# Patient Record
Sex: FEMALE | Race: WHITE | NOT HISPANIC OR LATINO
[De-identification: names, ages, dates, MRNs, and addresses within clinical notes are randomized per-mention and may not be internally consistent; named-entity substitution may affect disease eponyms.]

---

## 2019-02-08 ENCOUNTER — RECORD ABSTRACTING (OUTPATIENT)
Age: 79
End: 2019-02-08

## 2019-02-08 DIAGNOSIS — Z78.9 OTHER SPECIFIED HEALTH STATUS: ICD-10-CM

## 2019-02-08 DIAGNOSIS — Z87.891 PERSONAL HISTORY OF NICOTINE DEPENDENCE: ICD-10-CM

## 2019-02-08 DIAGNOSIS — Z86.39 PERSONAL HISTORY OF OTHER ENDOCRINE, NUTRITIONAL AND METABOLIC DISEASE: ICD-10-CM

## 2019-02-25 ENCOUNTER — APPOINTMENT (OUTPATIENT)
Dept: ENDOCRINOLOGY | Facility: CLINIC | Age: 79
End: 2019-02-25
Payer: MEDICARE

## 2019-02-25 VITALS
OXYGEN SATURATION: 97 % | SYSTOLIC BLOOD PRESSURE: 122 MMHG | WEIGHT: 118 LBS | BODY MASS INDEX: 20.14 KG/M2 | HEART RATE: 90 BPM | DIASTOLIC BLOOD PRESSURE: 80 MMHG | HEIGHT: 64 IN

## 2019-02-25 DIAGNOSIS — E04.1 NONTOXIC SINGLE THYROID NODULE: ICD-10-CM

## 2019-02-25 PROCEDURE — 99213 OFFICE O/P EST LOW 20 MIN: CPT

## 2019-02-25 NOTE — PHYSICAL EXAM
[Normal Rate and Effort] : normal respiratory rhythm and effort [Clear to Auscultation] : lungs were clear to auscultation bilaterally [Normal S1, S2] : normal S1 and S2 [Regular Rhythm] : with a regular rhythm [de-identified] : left lobe palpable, no nodule

## 2019-02-25 NOTE — HISTORY OF PRESENT ILLNESS
[FreeTextEntry1] : s/p right hemithyroidectomy in 2012 for benign disease. No h/o nodules in the left lobe. Normal thyroid function since surgery.\par \par PMH:\par hyperlipidemia. Cardiac evaluation including stress echo reported normal

## 2019-02-25 NOTE — ASSESSMENT
[FreeTextEntry1] : s/p partial thyroidectomy, clinically euthyroid\par hyperlipidemia\par updated labs ordered

## 2021-09-17 ENCOUNTER — EMERGENCY (EMERGENCY)
Facility: HOSPITAL | Age: 81
LOS: 1 days | Discharge: HOME | End: 2021-09-17
Attending: EMERGENCY MEDICINE
Payer: MEDICARE

## 2021-09-17 VITALS
RESPIRATION RATE: 16 BRPM | DIASTOLIC BLOOD PRESSURE: 80 MMHG | OXYGEN SATURATION: 95 % | TEMPERATURE: 98 F | HEIGHT: 66 IN | HEART RATE: 119 BPM | SYSTOLIC BLOOD PRESSURE: 154 MMHG

## 2021-09-17 VITALS
OXYGEN SATURATION: 96 % | SYSTOLIC BLOOD PRESSURE: 154 MMHG | RESPIRATION RATE: 14 BRPM | HEART RATE: 80 BPM | TEMPERATURE: 98 F | DIASTOLIC BLOOD PRESSURE: 76 MMHG

## 2021-09-17 DIAGNOSIS — E89.0 POSTPROCEDURAL HYPOTHYROIDISM: Chronic | ICD-10-CM

## 2021-09-17 LAB
ALBUMIN SERPL ELPH-MCNC: 4.2 G/DL — SIGNIFICANT CHANGE UP (ref 3.3–5.2)
ALP SERPL-CCNC: 46 U/L — SIGNIFICANT CHANGE UP (ref 40–120)
ALT FLD-CCNC: 17 U/L — SIGNIFICANT CHANGE UP
ANION GAP SERPL CALC-SCNC: 13 MMOL/L — SIGNIFICANT CHANGE UP (ref 5–17)
AST SERPL-CCNC: 25 U/L — SIGNIFICANT CHANGE UP
BASOPHILS # BLD AUTO: 0.02 K/UL — SIGNIFICANT CHANGE UP (ref 0–0.2)
BASOPHILS NFR BLD AUTO: 0.3 % — SIGNIFICANT CHANGE UP (ref 0–2)
BILIRUB SERPL-MCNC: 0.4 MG/DL — SIGNIFICANT CHANGE UP (ref 0.4–2)
BUN SERPL-MCNC: 13.2 MG/DL — SIGNIFICANT CHANGE UP (ref 8–20)
CALCIUM SERPL-MCNC: 9.3 MG/DL — SIGNIFICANT CHANGE UP (ref 8.6–10.2)
CHLORIDE SERPL-SCNC: 102 MMOL/L — SIGNIFICANT CHANGE UP (ref 98–107)
CK SERPL-CCNC: 103 U/L — SIGNIFICANT CHANGE UP (ref 25–170)
CO2 SERPL-SCNC: 25 MMOL/L — SIGNIFICANT CHANGE UP (ref 22–29)
CREAT SERPL-MCNC: 0.7 MG/DL — SIGNIFICANT CHANGE UP (ref 0.5–1.3)
EOSINOPHIL # BLD AUTO: 0.09 K/UL — SIGNIFICANT CHANGE UP (ref 0–0.5)
EOSINOPHIL NFR BLD AUTO: 1.5 % — SIGNIFICANT CHANGE UP (ref 0–6)
GLUCOSE SERPL-MCNC: 113 MG/DL — HIGH (ref 70–99)
HCT VFR BLD CALC: 43.3 % — SIGNIFICANT CHANGE UP (ref 34.5–45)
HGB BLD-MCNC: 13.9 G/DL — SIGNIFICANT CHANGE UP (ref 11.5–15.5)
IMM GRANULOCYTES NFR BLD AUTO: 0.2 % — SIGNIFICANT CHANGE UP (ref 0–1.5)
LYMPHOCYTES # BLD AUTO: 1.21 K/UL — SIGNIFICANT CHANGE UP (ref 1–3.3)
LYMPHOCYTES # BLD AUTO: 20.6 % — SIGNIFICANT CHANGE UP (ref 13–44)
MCHC RBC-ENTMCNC: 29.9 PG — SIGNIFICANT CHANGE UP (ref 27–34)
MCHC RBC-ENTMCNC: 32.1 GM/DL — SIGNIFICANT CHANGE UP (ref 32–36)
MCV RBC AUTO: 93.1 FL — SIGNIFICANT CHANGE UP (ref 80–100)
MONOCYTES # BLD AUTO: 0.71 K/UL — SIGNIFICANT CHANGE UP (ref 0–0.9)
MONOCYTES NFR BLD AUTO: 12.1 % — SIGNIFICANT CHANGE UP (ref 2–14)
NEUTROPHILS # BLD AUTO: 3.83 K/UL — SIGNIFICANT CHANGE UP (ref 1.8–7.4)
NEUTROPHILS NFR BLD AUTO: 65.3 % — SIGNIFICANT CHANGE UP (ref 43–77)
PLATELET # BLD AUTO: 157 K/UL — SIGNIFICANT CHANGE UP (ref 150–400)
POTASSIUM SERPL-MCNC: 4.5 MMOL/L — SIGNIFICANT CHANGE UP (ref 3.5–5.3)
POTASSIUM SERPL-SCNC: 4.5 MMOL/L — SIGNIFICANT CHANGE UP (ref 3.5–5.3)
PROT SERPL-MCNC: 7 G/DL — SIGNIFICANT CHANGE UP (ref 6.6–8.7)
RAPID RVP RESULT: SIGNIFICANT CHANGE UP
RBC # BLD: 4.65 M/UL — SIGNIFICANT CHANGE UP (ref 3.8–5.2)
RBC # FLD: 12.6 % — SIGNIFICANT CHANGE UP (ref 10.3–14.5)
SARS-COV-2 RNA SPEC QL NAA+PROBE: SIGNIFICANT CHANGE UP
SODIUM SERPL-SCNC: 140 MMOL/L — SIGNIFICANT CHANGE UP (ref 135–145)
TROPONIN T SERPL-MCNC: <0.01 NG/ML — SIGNIFICANT CHANGE UP (ref 0–0.06)
WBC # BLD: 5.87 K/UL — SIGNIFICANT CHANGE UP (ref 3.8–10.5)
WBC # FLD AUTO: 5.87 K/UL — SIGNIFICANT CHANGE UP (ref 3.8–10.5)

## 2021-09-17 PROCEDURE — 70450 CT HEAD/BRAIN W/O DYE: CPT | Mod: MA

## 2021-09-17 PROCEDURE — 0225U NFCT DS DNA&RNA 21 SARSCOV2: CPT

## 2021-09-17 PROCEDURE — 80053 COMPREHEN METABOLIC PANEL: CPT

## 2021-09-17 PROCEDURE — 99285 EMERGENCY DEPT VISIT HI MDM: CPT | Mod: GC

## 2021-09-17 PROCEDURE — 93005 ELECTROCARDIOGRAM TRACING: CPT

## 2021-09-17 PROCEDURE — 84484 ASSAY OF TROPONIN QUANT: CPT

## 2021-09-17 PROCEDURE — 99284 EMERGENCY DEPT VISIT MOD MDM: CPT | Mod: 25

## 2021-09-17 PROCEDURE — 70450 CT HEAD/BRAIN W/O DYE: CPT | Mod: 26,MA

## 2021-09-17 PROCEDURE — 93010 ELECTROCARDIOGRAM REPORT: CPT

## 2021-09-17 PROCEDURE — 82550 ASSAY OF CK (CPK): CPT

## 2021-09-17 PROCEDURE — 85025 COMPLETE CBC W/AUTO DIFF WBC: CPT

## 2021-09-17 PROCEDURE — 36415 COLL VENOUS BLD VENIPUNCTURE: CPT

## 2021-09-17 RX ORDER — METOCLOPRAMIDE HCL 10 MG
10 TABLET ORAL ONCE
Refills: 0 | Status: COMPLETED | OUTPATIENT
Start: 2021-09-17 | End: 2021-09-17

## 2021-09-17 RX ORDER — SODIUM CHLORIDE 9 MG/ML
500 INJECTION INTRAMUSCULAR; INTRAVENOUS; SUBCUTANEOUS ONCE
Refills: 0 | Status: COMPLETED | OUTPATIENT
Start: 2021-09-17 | End: 2021-09-17

## 2021-09-17 RX ADMIN — SODIUM CHLORIDE 500 MILLILITER(S): 9 INJECTION INTRAMUSCULAR; INTRAVENOUS; SUBCUTANEOUS at 19:28

## 2021-09-17 NOTE — ED PROVIDER NOTE - CLINICAL SUMMARY MEDICAL DECISION MAKING FREE TEXT BOX
plan to do priority ct head and check labs, ekg nsr @ 108 no changes, ct head neg, will do near syncope work up and check covid , if all neg, will advise to f/u with cardiology and concussion clinic

## 2021-09-17 NOTE — ED PROVIDER NOTE - PROGRESS NOTE DETAILS
pt feels better, advised to rest and eat well, follow up with her cardiology and given concussion clinic f/u

## 2021-09-17 NOTE — ED PROVIDER NOTE - ATTENDING CONTRIBUTION TO CARE
Hermelindo AMARAL- 82 Y/O F with h/o hypothyroid, hld p/w dizziness today. Pt had episode of near syncope while shovelling  rocks at home  3 days ago and fell backwards on her head, no loc but hit it hard and didnt seek any care that day and today dizziness recurred and she got worried. No fever, chills. No wpt has diffuse pains on the neck and back and legs. No nausea, vomiting    Pt is alert, well appearing female, s1s2 normal reg, b/l clear breath sounds, abd soft, nt, nd, neuro exam aox3, cn 2-12 intact, no focal deficits, skin warm, dry, good turgor , normal gait, peerl eomi, neck soft,  no midline tenderness, full rom at neck    plan to do priority ct head and check labs, ekg nsr @ 108 no changes, ct head neg, will do near syncope work up and check covid , if all neg, will advise to f/u with cardiology and concussion clinic

## 2021-09-17 NOTE — ED PROVIDER NOTE - PHYSICAL EXAMINATION
Neurological: CN II-XII intact, no motor deficits, motor strength 5/5, no sensory loss, no focal neurologic deficits.

## 2021-09-17 NOTE — ED ADULT NURSE NOTE - OBJECTIVE STATEMENT
81y female AOx4 c/o dizziness/lightheadedness with movement. pt is s/p near syncopal fall on Tuesday 9/14/21. pt reports lightheadedness ever since fall. pt endorses headstrike, denies blood thinners. respirations even and unlabored. denies chest discomfort. speech WNL. no loss in strength or sensation in extremities. MAEx4 without difficulty. skin WNL for race.

## 2021-09-17 NOTE — ED PROVIDER NOTE - CARE PLAN
1 Principal Discharge DX:	Near syncope  Secondary Diagnosis:	Closed head injury without concussion

## 2021-09-17 NOTE — ED ADULT TRIAGE NOTE - CHIEF COMPLAINT QUOTE
Pt reports had a fall 2 days ago causing her to hit back of head on grass and denies LOC. Today pt had episode of "lightheadedness" that has since resolved but having palpitations. MD called for STAT eval.

## 2021-09-17 NOTE — ED PROVIDER NOTE - OBJECTIVE STATEMENT
Pt is a 81y Female with PMHx of hypercholesterolemia, cataracts, recurrent sinusitis, and vertigo presents to ED today c/o 2 episodes of lightheadedness x 2 days. Pt states 2 days ago she became lightheaded and felt "strange" and fell backwards and hit head on grass outside. Pt reports this episode felt different from prior dizziness associated with sinusitis and prior episodes of vertigo. Denies fever, chills, HA, LOC, chest pain, abdominal pain, N/V, changes in vision, changes in hearing, recent illness.

## 2021-09-17 NOTE — ED PROVIDER NOTE - NEURO NEGATIVE STATEMENT, MLM
+NEAR-SYNCOPE, +LIGHTHEADEDNESS, no loss of consciousness, no gait abnormality, no headache, no sensory deficits, and no weakness.

## 2021-09-17 NOTE — ED PROVIDER NOTE - PATIENT PORTAL LINK FT
You can access the FollowMyHealth Patient Portal offered by [facility] by registering at the following website: [email or web address]. By joining [facility]’s FollowMyHealth portal, you will also be able to view your health information using other applications (apps) compatible with our system.

## 2021-09-17 NOTE — ED PROVIDER NOTE - NSFOLLOWUPCLINICS_GEN_ALL_ED_FT
[facility] Concussion [facility]  Concussion  [facility] [place] [address] [place] [address]  Phone: [phone]  Fax:   Follow Up Time: 1-3 Days

## 2021-09-17 NOTE — ED PROVIDER NOTE - NSFOLLOWUPINSTRUCTIONS_ED_ALL_ED_FT
1. don't do heavy activity and exercise until all symptoms resolved  2. stay hydrated and rest  3. follow up with your cardiologist in this week  4. follow up with concussion clinic in 1 wk  5. return promptly in c/o worsening symptoms

## 2021-10-26 ENCOUNTER — APPOINTMENT (OUTPATIENT)
Dept: ENDOCRINOLOGY | Facility: CLINIC | Age: 81
End: 2021-10-26
Payer: MEDICARE

## 2021-10-26 VITALS — HEART RATE: 108 BPM | OXYGEN SATURATION: 98 %

## 2021-10-26 VITALS — BODY MASS INDEX: 20.83 KG/M2 | HEIGHT: 64 IN | WEIGHT: 122 LBS

## 2021-10-26 VITALS — SYSTOLIC BLOOD PRESSURE: 122 MMHG | DIASTOLIC BLOOD PRESSURE: 72 MMHG

## 2021-10-26 DIAGNOSIS — E78.00 PURE HYPERCHOLESTEROLEMIA, UNSPECIFIED: ICD-10-CM

## 2021-10-26 DIAGNOSIS — E89.0 POSTPROCEDURAL HYPOTHYROIDISM: ICD-10-CM

## 2021-10-26 PROCEDURE — 99213 OFFICE O/P EST LOW 20 MIN: CPT

## 2021-10-26 NOTE — ASSESSMENT
[FreeTextEntry1] : s/p partial thyroidectomy,  euthyroid\par hyperlipidemia on therapy\par \par (labs and hospital records reviewed)

## 2021-10-26 NOTE — PHYSICAL EXAM
[Thyroid Not Enlarged] : the thyroid was not enlarged [No Thyroid Nodules] : no palpable thyroid nodules [Well Healed Scar] : well healed scar [Clear to Auscultation] : lungs were clear to auscultation bilaterally [Normal Rate] : heart rate was normal

## 2021-10-26 NOTE — HISTORY OF PRESENT ILLNESS
[FreeTextEntry1] : s/p right hemithyroidectomy in 2012 for benign disease. No h/o nodules in the left lobe. Normal thyroid function since surgery.\par \par PMH:\par hyperlipidemia. Cardiac evaluation including stress echo reported normal\par \par near syncopal episode while working. ER evaluation negative

## 2021-11-24 ENCOUNTER — APPOINTMENT (OUTPATIENT)
Dept: ENDOCRINOLOGY | Facility: CLINIC | Age: 81
End: 2021-11-24

## 2023-02-17 ENCOUNTER — OFFICE (OUTPATIENT)
Dept: URBAN - METROPOLITAN AREA CLINIC 104 | Facility: CLINIC | Age: 83
Setting detail: OPHTHALMOLOGY
End: 2023-02-17
Payer: MEDICARE

## 2023-02-17 DIAGNOSIS — H43.813: ICD-10-CM

## 2023-02-17 DIAGNOSIS — H01.005: ICD-10-CM

## 2023-02-17 DIAGNOSIS — H01.002: ICD-10-CM

## 2023-02-17 DIAGNOSIS — H26.493: ICD-10-CM

## 2023-02-17 DIAGNOSIS — H04.123: ICD-10-CM

## 2023-02-17 DIAGNOSIS — H01.004: ICD-10-CM

## 2023-02-17 DIAGNOSIS — H01.001: ICD-10-CM

## 2023-02-17 DIAGNOSIS — H43.393: ICD-10-CM

## 2023-02-17 DIAGNOSIS — Z96.1: ICD-10-CM

## 2023-02-17 PROCEDURE — 92014 COMPRE OPH EXAM EST PT 1/>: CPT | Performed by: OPTOMETRIST

## 2023-02-17 ASSESSMENT — AXIALLENGTH_DERIVED
OD_AL: 23.13
OS_AL: 22.9279

## 2023-02-17 ASSESSMENT — VISUAL ACUITY
OS_BCVA: 20/25-2
OD_BCVA: 20/20-1

## 2023-02-17 ASSESSMENT — LID EXAM ASSESSMENTS
OD_BLEPHARITIS: RLL RUL 1+
OS_BLEPHARITIS: LLL LUL 1+

## 2023-02-17 ASSESSMENT — CONFRONTATIONAL VISUAL FIELD TEST (CVF)
OD_FINDINGS: FULL
OS_FINDINGS: FULL

## 2023-02-17 ASSESSMENT — REFRACTION_AUTOREFRACTION
OS_CYLINDER: -0.50
OD_SPHERE: +0.50
OD_CYLINDER: -0.50
OS_AXIS: 102
OS_SPHERE: +0.50
OD_AXIS: 111

## 2023-02-17 ASSESSMENT — KERATOMETRY
OS_K1POWER_DIOPTERS: 44.70
OD_K1POWER_DIOPTERS: 44.35
OD_K2POWER_DIOPTERS: 44.70
OS_K2POWER_DIOPTERS: 45.49
OD_AXISANGLE_DEGREES: 023
OS_AXISANGLE_DEGREES: 075

## 2023-02-17 ASSESSMENT — SPHEQUIV_DERIVED
OD_SPHEQUIV: 0.25
OS_SPHEQUIV: 0.25

## 2023-02-17 ASSESSMENT — TONOMETRY
OD_IOP_MMHG: 18
OS_IOP_MMHG: 17

## 2023-02-17 ASSESSMENT — SUPERFICIAL PUNCTATE KERATITIS (SPK)
OS_SPK: ABSENT
OD_SPK: ABSENT

## 2024-01-16 ENCOUNTER — APPOINTMENT (OUTPATIENT)
Dept: ORTHOPEDIC SURGERY | Facility: CLINIC | Age: 84
End: 2024-01-16
Payer: MEDICARE

## 2024-01-16 VITALS — BODY MASS INDEX: 21.16 KG/M2 | HEIGHT: 65 IN | WEIGHT: 127 LBS

## 2024-01-16 DIAGNOSIS — M17.12 UNILATERAL PRIMARY OSTEOARTHRITIS, LEFT KNEE: ICD-10-CM

## 2024-01-16 DIAGNOSIS — M25.562 PAIN IN LEFT KNEE: ICD-10-CM

## 2024-01-16 DIAGNOSIS — S89.92XA UNSPECIFIED INJURY OF LEFT LOWER LEG, INITIAL ENCOUNTER: ICD-10-CM

## 2024-01-16 PROCEDURE — 99204 OFFICE O/P NEW MOD 45 MIN: CPT

## 2024-01-16 RX ORDER — ROSUVASTATIN CALCIUM 5 MG/1
TABLET, FILM COATED ORAL
Refills: 0 | Status: ACTIVE | COMMUNITY

## 2024-01-16 NOTE — DISCUSSION/SUMMARY
[de-identified] : Reviewed all X ray images with patient. Advised patient to wear Reparel knee sleeve, informational card provided. Patient will follow up as needed.    ----------------------------------------------- Home Exercise The patient is instructed on a home exercise program.  KLEVER ANGULO Acting as a Scribe for Dr. Urbano SALAZAR, Klever Angulo, attest that this documentation has been prepared under the direction and in the presence of Provider Jefe Clement MD.  Activity Modification The patient was advised to modify their activities.  Dx / Natural History The patient was advised of the diagnosis.  The natural history of the pathology was explained in full to the patient in layman's terms.  Several different treatment options were discussed and explained in full to the patient including the risks and benefits of both surgical and non-surgical treatments.  All questions and concerns were answered.  Pain Guide Activities The patient was advised to let pain guide the gradual advancement of activities.  RICE I explained to the patient that rest, ice, compression, and elevation would benefit them.  They may return to activity after follow-up or when they no longer have any pain.  The patient's current medication management of their orthopedic diagnosis was reviewed today: (1) We discussed a comprehensive treatment plan that included possible pharmaceutical management involving the use of prescription strength medications including but not limited to options such as oral Naprosyn 500mg BID, once daily Meloxicam 15 mg, or 500-650 mg Tylenol versus over the counter oral medications and topical prescription NSAID Pennsaid vs over the counter Voltaren gel. (2) There is a moderate risk of morbidity with further treatment, especially from use of prescription strength medications and possible side effects of these medications which include upset stomach with oral medications, skin reactions to topical medications and cardiac/renal issues with long term use. (3) I recommended that the patient follow-up with their medical physician to discuss any significant specific potential issues with long term medication use such as interactions with current medications or with exacerbation of underlying medical comorbidities. (4) The benefits and risks associated with use of injectable, oral or topical, prescription and over the counter anti-inflammatory medications were discussed with the patient. The patient voiced understanding of the risks including but not limited to bleeding, stroke, kidney dysfunction, heart disease, and were referred to the black box warning label for further information.

## 2024-01-16 NOTE — PHYSICAL EXAM
[de-identified] : Left Knee: Mild effusion, FULL ROM neurovascularly intact ligamentously stable Non-tender

## 2024-01-16 NOTE — HISTORY OF PRESENT ILLNESS
[de-identified] : The patient is a 83 year old RIGHT hand dominant female who presents today complaining of L knee pain.   Date of Injury/Onset: ~11/23 Pain:    At Rest: 3/10  With Activity:  8/10  Mechanism of injury: Insidious This is NOT a Work Related Injury being treated under Worker's Compensation. This is NOT an athletic injury occurring associated with an interscholastic or organized sports team. Quality of symptoms: stiffness, aching Improves with: N/A Worse with: Stairs Prior treatment: PCP Prior Imaging: X-ray/US ([facility]) Out of work/sport: _, since _ School/Sport/Position/Occupation: Retired  Additional Information: None

## 2024-01-16 NOTE — DATA REVIEWED
[FreeTextEntry1] : 12/29/23 [facility] X-Ray Examination of the LEFT KNEE: 4 views: patella kareem, mild lateral OA

## 2024-03-01 ENCOUNTER — OFFICE (OUTPATIENT)
Dept: URBAN - METROPOLITAN AREA CLINIC 104 | Facility: CLINIC | Age: 84
Setting detail: OPHTHALMOLOGY
End: 2024-03-01
Payer: MEDICARE

## 2024-03-01 DIAGNOSIS — H01.002: ICD-10-CM

## 2024-03-01 DIAGNOSIS — H43.393: ICD-10-CM

## 2024-03-01 DIAGNOSIS — H01.001: ICD-10-CM

## 2024-03-01 DIAGNOSIS — H01.005: ICD-10-CM

## 2024-03-01 DIAGNOSIS — Z96.1: ICD-10-CM

## 2024-03-01 DIAGNOSIS — H26.493: ICD-10-CM

## 2024-03-01 DIAGNOSIS — H04.123: ICD-10-CM

## 2024-03-01 DIAGNOSIS — H01.004: ICD-10-CM

## 2024-03-01 DIAGNOSIS — H43.813: ICD-10-CM

## 2024-03-01 PROCEDURE — 92014 COMPRE OPH EXAM EST PT 1/>: CPT | Performed by: OPTOMETRIST

## 2024-03-01 ASSESSMENT — LID EXAM ASSESSMENTS
OS_BLEPHARITIS: LLL LUL 1+
OD_BLEPHARITIS: RLL RUL 1+

## 2024-03-01 ASSESSMENT — REFRACTION_CURRENTRX
OS_OVR_VA: 20/
OD_OVR_VA: 20/

## 2025-03-13 ENCOUNTER — OFFICE (OUTPATIENT)
Dept: URBAN - METROPOLITAN AREA CLINIC 104 | Facility: CLINIC | Age: 85
Setting detail: OPHTHALMOLOGY
End: 2025-03-13
Payer: MEDICARE

## 2025-03-13 ENCOUNTER — RX ONLY (RX ONLY)
Age: 85
End: 2025-03-13

## 2025-03-13 DIAGNOSIS — H01.001: ICD-10-CM

## 2025-03-13 DIAGNOSIS — H01.002: ICD-10-CM

## 2025-03-13 DIAGNOSIS — H04.121: ICD-10-CM

## 2025-03-13 DIAGNOSIS — H04.122: ICD-10-CM

## 2025-03-13 DIAGNOSIS — H01.004: ICD-10-CM

## 2025-03-13 DIAGNOSIS — H01.005: ICD-10-CM

## 2025-03-13 PROCEDURE — 83861 MICROFLUID ANALY TEARS: CPT | Mod: QW,RT | Performed by: OPTOMETRIST

## 2025-03-13 PROCEDURE — 83861 MICROFLUID ANALY TEARS: CPT | Mod: QW,LT | Performed by: OPTOMETRIST

## 2025-03-13 PROCEDURE — 92014 COMPRE OPH EXAM EST PT 1/>: CPT | Performed by: OPTOMETRIST

## 2025-03-13 ASSESSMENT — CONFRONTATIONAL VISUAL FIELD TEST (CVF)
OD_FINDINGS: FULL
OS_FINDINGS: FULL

## 2025-03-13 ASSESSMENT — KERATOMETRY
OD_K2POWER_DIOPTERS: 45.24
OS_AXISANGLE_DEGREES: 074
OS_K2POWER_DIOPTERS: 46.42
OS_K1POWER_DIOPTERS: 44.70
OD_K1POWER_DIOPTERS: 44.58
OD_AXISANGLE_DEGREES: 066

## 2025-03-13 ASSESSMENT — REFRACTION_AUTOREFRACTION
OD_SPHERE: +0.50
OD_CYLINDER: -0.75
OS_CYLINDER: -0.50
OD_AXIS: 113
OS_SPHERE: 0.00
OS_AXIS: 085

## 2025-03-13 ASSESSMENT — VISUAL ACUITY
OD_BCVA: 20/20-1
OS_BCVA: 20/20-1

## 2025-03-13 ASSESSMENT — REFRACTION_CURRENTRX
OD_OVR_VA: 20/
OS_OVR_VA: 20/

## 2025-03-13 ASSESSMENT — LID EXAM ASSESSMENTS
OD_BLEPHARITIS: RLL RUL 1+
OS_BLEPHARITIS: LLL LUL 1+